# Patient Record
Sex: FEMALE | Race: WHITE | NOT HISPANIC OR LATINO
[De-identification: names, ages, dates, MRNs, and addresses within clinical notes are randomized per-mention and may not be internally consistent; named-entity substitution may affect disease eponyms.]

---

## 2022-09-28 ENCOUNTER — APPOINTMENT (OUTPATIENT)
Dept: CARDIOLOGY | Facility: CLINIC | Age: 57
End: 2022-09-28

## 2022-09-28 ENCOUNTER — NON-APPOINTMENT (OUTPATIENT)
Age: 57
End: 2022-09-28

## 2022-09-28 VITALS — DIASTOLIC BLOOD PRESSURE: 80 MMHG | SYSTOLIC BLOOD PRESSURE: 128 MMHG

## 2022-09-28 VITALS
TEMPERATURE: 98 F | WEIGHT: 192 LBS | HEIGHT: 68 IN | OXYGEN SATURATION: 95 % | SYSTOLIC BLOOD PRESSURE: 132 MMHG | DIASTOLIC BLOOD PRESSURE: 82 MMHG | HEART RATE: 100 BPM | BODY MASS INDEX: 29.1 KG/M2

## 2022-09-28 DIAGNOSIS — G47.00 INSOMNIA, UNSPECIFIED: ICD-10-CM

## 2022-09-28 DIAGNOSIS — Z00.00 ENCOUNTER FOR GENERAL ADULT MEDICAL EXAMINATION W/OUT ABNORMAL FINDINGS: ICD-10-CM

## 2022-09-28 DIAGNOSIS — Z83.3 FAMILY HISTORY OF DIABETES MELLITUS: ICD-10-CM

## 2022-09-28 DIAGNOSIS — Z78.9 OTHER SPECIFIED HEALTH STATUS: ICD-10-CM

## 2022-09-28 DIAGNOSIS — Z72.89 OTHER PROBLEMS RELATED TO LIFESTYLE: ICD-10-CM

## 2022-09-28 PROCEDURE — 99205 OFFICE O/P NEW HI 60 MIN: CPT | Mod: 25

## 2022-09-28 PROCEDURE — 93000 ELECTROCARDIOGRAM COMPLETE: CPT

## 2022-09-28 NOTE — DISCUSSION/SUMMARY
[Patient] : the patient [Risks] : risks [Benefits] : benefits [Alternatives] : alternatives [With Me] : with me [___ Month(s)] : in [unfilled] month(s) [FreeTextEntry1] : This is a 56 year old woman with history insomnia, dyslipidemia , and Diabetes Mellitus , here for coronary artery disease evaluation.\par \par 1) coronary artery disease evaluation: riskf factors for coronary artery disease . smoking, .md and dyslipidemia , Exercise stress test.  2D echo. \par 2)  dyslipidemia : ct statins.\par next visit evaluation for ct calcium scopre  [EKG obtained to assist in diagnosis and management of assessed problem(s)] : EKG obtained to assist in diagnosis and management of assessed problem(s)

## 2022-09-28 NOTE — HISTORY OF PRESENT ILLNESS
[FreeTextEntry1] : Insomnia and coronary artery disease evaluation \par \par \par This is a 56 year old woman with history insomnia, dyslipidemia , and Diabetes Mellitus , here for coronary artery disease evaluation.\par difficulty falling asleep. \par no chest pain on exertion. no dyspnea on exertion no dizziness. no syncope. no palpitaitons.\par \par \par family history \par Mother No myocardial infarction . no cerebrovascular accident \par father: no myocardial infarction . no cerebrovascular accident \par \par occasional alcohol. occasional smoking. no drugs.  prior extensive smoke. a pack a day when she was 19/s and 29.  and she moke for 20 years.\par  \par \par

## 2022-10-27 ENCOUNTER — APPOINTMENT (OUTPATIENT)
Dept: CARDIOLOGY | Facility: CLINIC | Age: 57
End: 2022-10-27

## 2022-11-07 ENCOUNTER — APPOINTMENT (OUTPATIENT)
Dept: CARDIOLOGY | Facility: CLINIC | Age: 57
End: 2022-11-07

## 2023-01-10 ENCOUNTER — APPOINTMENT (OUTPATIENT)
Dept: CARDIOLOGY | Facility: CLINIC | Age: 58
End: 2023-01-10

## 2023-01-17 ENCOUNTER — APPOINTMENT (OUTPATIENT)
Dept: CARDIOLOGY | Facility: CLINIC | Age: 58
End: 2023-01-17

## 2023-04-04 ENCOUNTER — APPOINTMENT (OUTPATIENT)
Dept: CARDIOLOGY | Facility: CLINIC | Age: 58
End: 2023-04-04

## 2023-04-04 RX ORDER — INSULIN GLARGINE 100 [IU]/ML
100 INJECTION, SOLUTION SUBCUTANEOUS DAILY
Refills: 0 | Status: ACTIVE | COMMUNITY

## 2023-04-04 RX ORDER — METFORMIN HYDROCHLORIDE 1000 MG/1
1000 TABLET, COATED ORAL DAILY
Refills: 0 | Status: ACTIVE | COMMUNITY

## 2023-04-04 RX ORDER — ATORVASTATIN CALCIUM 40 MG/1
40 TABLET, FILM COATED ORAL DAILY
Refills: 0 | Status: ACTIVE | COMMUNITY

## 2023-06-07 ENCOUNTER — NON-APPOINTMENT (OUTPATIENT)
Age: 58
End: 2023-06-07

## 2023-06-07 ENCOUNTER — APPOINTMENT (OUTPATIENT)
Dept: CARDIOLOGY | Facility: CLINIC | Age: 58
End: 2023-06-07
Payer: COMMERCIAL

## 2023-06-07 VITALS
HEART RATE: 101 BPM | SYSTOLIC BLOOD PRESSURE: 162 MMHG | TEMPERATURE: 97.9 F | OXYGEN SATURATION: 96 % | DIASTOLIC BLOOD PRESSURE: 90 MMHG | BODY MASS INDEX: 29.35 KG/M2 | WEIGHT: 193 LBS

## 2023-06-07 DIAGNOSIS — I73.9 PERIPHERAL VASCULAR DISEASE, UNSPECIFIED: ICD-10-CM

## 2023-06-07 DIAGNOSIS — E78.00 PURE HYPERCHOLESTEROLEMIA, UNSPECIFIED: ICD-10-CM

## 2023-06-07 DIAGNOSIS — R94.31 ABNORMAL ELECTROCARDIOGRAM [ECG] [EKG]: ICD-10-CM

## 2023-06-07 DIAGNOSIS — E11.9 TYPE 2 DIABETES MELLITUS W/OUT COMPLICATIONS: ICD-10-CM

## 2023-06-07 DIAGNOSIS — I10 ESSENTIAL (PRIMARY) HYPERTENSION: ICD-10-CM

## 2023-06-07 PROCEDURE — 93000 ELECTROCARDIOGRAM COMPLETE: CPT

## 2023-06-07 PROCEDURE — 99214 OFFICE O/P EST MOD 30 MIN: CPT | Mod: 25

## 2023-06-28 ENCOUNTER — APPOINTMENT (OUTPATIENT)
Dept: CARDIOLOGY | Facility: CLINIC | Age: 58
End: 2023-06-28
Payer: COMMERCIAL

## 2023-06-28 PROCEDURE — 93015 CV STRESS TEST SUPVJ I&R: CPT

## 2023-08-22 ENCOUNTER — APPOINTMENT (OUTPATIENT)
Dept: CARDIOLOGY | Facility: CLINIC | Age: 58
End: 2023-08-22
Payer: COMMERCIAL

## 2023-08-22 PROCEDURE — 78452 HT MUSCLE IMAGE SPECT MULT: CPT

## 2023-08-22 PROCEDURE — 93015 CV STRESS TEST SUPVJ I&R: CPT

## 2023-08-22 PROCEDURE — A9500: CPT

## 2023-09-26 ENCOUNTER — APPOINTMENT (OUTPATIENT)
Dept: CARDIOLOGY | Facility: CLINIC | Age: 58
End: 2023-09-26
Payer: COMMERCIAL

## 2023-09-26 PROCEDURE — 93923 UPR/LXTR ART STDY 3+ LVLS: CPT

## 2023-09-26 PROCEDURE — 93306 TTE W/DOPPLER COMPLETE: CPT

## 2023-10-02 ENCOUNTER — NON-APPOINTMENT (OUTPATIENT)
Age: 58
End: 2023-10-02

## 2023-10-03 DIAGNOSIS — R68.89 OTHER GENERAL SYMPTOMS AND SIGNS: ICD-10-CM

## 2023-11-06 ENCOUNTER — APPOINTMENT (OUTPATIENT)
Dept: CARDIOLOGY | Facility: CLINIC | Age: 58
End: 2023-11-06
Payer: COMMERCIAL

## 2023-11-06 PROCEDURE — 93925 LOWER EXTREMITY STUDY: CPT

## 2023-11-07 ENCOUNTER — OFFICE (OUTPATIENT)
Facility: LOCATION | Age: 58
Setting detail: OPHTHALMOLOGY
End: 2023-11-07
Payer: COMMERCIAL

## 2023-11-07 ENCOUNTER — NON-APPOINTMENT (OUTPATIENT)
Age: 58
End: 2023-11-07

## 2023-11-07 DIAGNOSIS — H25.11: ICD-10-CM

## 2023-11-07 DIAGNOSIS — E11.9: ICD-10-CM

## 2023-11-07 DIAGNOSIS — H35.373: ICD-10-CM

## 2023-11-07 DIAGNOSIS — H16.223: ICD-10-CM
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
## 2023-11-07 DIAGNOSIS — Z13.5: ICD-10-CM

## 2023-11-07 DIAGNOSIS — Z96.1: ICD-10-CM

## 2023-11-07 PROCEDURE — OPTOS FUNDUS PHOTOGRAPHY - NO FINDINGS: Performed by: OPHTHALMOLOGY

## 2023-11-07 PROCEDURE — 92014 COMPRE OPH EXAM EST PT 1/>: CPT | Performed by: OPHTHALMOLOGY

## 2023-11-07 PROCEDURE — 92250 FUNDUS PHOTOGRAPHY W/I&R: CPT | Mod: GY | Performed by: OPHTHALMOLOGY

## 2023-11-07 ASSESSMENT — CORNEAL DYSTROPHY - POSTERIOR
OD_POSTERIORDYSTROPHY: T GUTTATA
OS_POSTERIORDYSTROPHY: T GUTTATA

## 2023-11-07 ASSESSMENT — CONFRONTATIONAL VISUAL FIELD TEST (CVF)
OS_FINDINGS: FULL
OD_FINDINGS: FULL

## 2023-11-07 ASSESSMENT — SUPERFICIAL PUNCTATE KERATITIS (SPK)
OD_SPK: T 1+
OS_SPK: T 1+

## 2023-11-08 ASSESSMENT — REFRACTION_CURRENTRX
OD_AXIS: 106
OS_CYLINDER: +1.25
OD_CYLINDER: +0.50
OS_AXIS: 095
OD_SPHERE: +0.50
OS_SPHERE: +1.75
OD_OVR_VA: 20/
OS_OVR_VA: 20/
OD_VPRISM_DIRECTION: SV
OS_VPRISM_DIRECTION: SV

## 2023-11-08 ASSESSMENT — REFRACTION_AUTOREFRACTION
OS_CYLINDER: +1.25
OS_AXIS: 107
OS_SPHERE: -0.50
OD_SPHERE: -1.00
OD_CYLINDER: +0.50
OD_AXIS: 064

## 2023-11-08 ASSESSMENT — SPHEQUIV_DERIVED
OS_SPHEQUIV: 0.125
OD_SPHEQUIV: -0.75

## 2023-11-20 ENCOUNTER — OFFICE (OUTPATIENT)
Facility: LOCATION | Age: 58
Setting detail: OPHTHALMOLOGY
End: 2023-11-20
Payer: COMMERCIAL

## 2023-11-20 DIAGNOSIS — H25.11: ICD-10-CM

## 2023-11-20 DIAGNOSIS — Z13.5: ICD-10-CM

## 2023-11-20 DIAGNOSIS — H31.29: ICD-10-CM

## 2023-11-20 DIAGNOSIS — H18.513: ICD-10-CM

## 2023-11-20 DIAGNOSIS — H43.393: ICD-10-CM

## 2023-11-20 DIAGNOSIS — H35.373: ICD-10-CM

## 2023-11-20 DIAGNOSIS — H16.223: ICD-10-CM

## 2023-11-20 DIAGNOSIS — E11.9: ICD-10-CM

## 2023-11-20 PROCEDURE — 92250 FUNDUS PHOTOGRAPHY W/I&R: CPT | Mod: GY | Performed by: OPHTHALMOLOGY

## 2023-11-20 PROCEDURE — 99213 OFFICE O/P EST LOW 20 MIN: CPT | Performed by: OPHTHALMOLOGY

## 2023-11-20 ASSESSMENT — CONFRONTATIONAL VISUAL FIELD TEST (CVF)
OS_FINDINGS: FULL
OD_FINDINGS: FULL

## 2023-11-20 ASSESSMENT — SUPERFICIAL PUNCTATE KERATITIS (SPK)
OD_SPK: T 1+
OS_SPK: T 1+

## 2023-11-20 ASSESSMENT — CORNEAL DYSTROPHY - POSTERIOR
OS_POSTERIORDYSTROPHY: T GUTTATA
OD_POSTERIORDYSTROPHY: T GUTTATA

## 2023-11-21 ASSESSMENT — REFRACTION_CURRENTRX
OS_SPHERE: +1.75
OS_CYLINDER: +1.25
OS_AXIS: 095
OD_OVR_VA: 20/
OS_VPRISM_DIRECTION: SV
OD_CYLINDER: +0.50
OD_SPHERE: +0.50
OD_AXIS: 106
OS_OVR_VA: 20/
OD_VPRISM_DIRECTION: SV

## 2023-11-21 ASSESSMENT — REFRACTION_AUTOREFRACTION
OS_CYLINDER: +1.25
OD_AXIS: 064
OS_SPHERE: -0.50
OD_SPHERE: -1.00
OS_AXIS: 107
OD_CYLINDER: +0.50

## 2023-11-21 ASSESSMENT — SPHEQUIV_DERIVED
OS_SPHEQUIV: 0.125
OD_SPHEQUIV: -0.75

## 2023-12-19 ENCOUNTER — APPOINTMENT (OUTPATIENT)
Dept: CARDIOLOGY | Facility: CLINIC | Age: 58
End: 2023-12-19

## 2024-02-29 ENCOUNTER — APPOINTMENT (OUTPATIENT)
Dept: GYNECOLOGIC ONCOLOGY | Facility: CLINIC | Age: 59
End: 2024-02-29
Payer: COMMERCIAL

## 2024-02-29 DIAGNOSIS — N83.8 OTHER NONINFLAMMATORY DISORDERS OF OVARY, FALLOPIAN TUBE AND BROAD LIGAMENT: ICD-10-CM

## 2024-02-29 DIAGNOSIS — Z87.891 PERSONAL HISTORY OF NICOTINE DEPENDENCE: ICD-10-CM

## 2024-02-29 PROCEDURE — 99459 PELVIC EXAMINATION: CPT

## 2024-02-29 PROCEDURE — 99204 OFFICE O/P NEW MOD 45 MIN: CPT

## 2024-02-29 RX ORDER — RAMIPRIL 10 MG/1
10 CAPSULE ORAL DAILY
Refills: 0 | Status: DISCONTINUED | COMMUNITY
End: 2024-02-29

## 2024-03-04 ENCOUNTER — NON-APPOINTMENT (OUTPATIENT)
Age: 59
End: 2024-03-04

## 2024-03-04 ENCOUNTER — APPOINTMENT (OUTPATIENT)
Dept: INTERNAL MEDICINE | Facility: CLINIC | Age: 59
End: 2024-03-04

## 2024-03-08 NOTE — CHIEF COMPLAINT
[FreeTextEntry1] : [facility] [phone] at [address] [place] [address]

## 2024-03-08 NOTE — REVIEW OF SYSTEMS
[Negative] : Musculoskeletal [Hematuria] : no hematuria [Dysuria] : no dysuria [Abn Vag Bleeding] : no abnormal vaginal bleeding [Vaginal Discharge] : no vaginal discharge [Dyspareunia] : no dyspareunia [Incontinence] : no incontinence [FreeTextEntry4] : b/l pelvic discomfort

## 2024-03-08 NOTE — HISTORY OF PRESENT ILLNESS
[FreeTextEntry1] : 59yo  LMP in her 30's presents today with referral from Dr. Doran for ovarian mass. Pt. reports occasional discomfort in bilateral pelvis for a few months now. Pelvic US performed 24 revealed 4.4cm septated cystic mass in R. ovary with internal vascularity. Uterus [id number]'s & calcified.  Pelvic MRI performed on 24 revealed enlarged R. ovary, 4.5cm's with septate cystic mass measuring 2cm's with enhancing mural nodule. 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 Posterior to mass is 3cm's, unilocular cyst which may represent adjacent cyst vs. posterior extension of lesion. No lymphadenopathy or peritoneal disease. No ascites. Patient denies early satiety or bloating, PMB or changes in bowel/bladder habits.    was WNL, CEA was elevated at 8.6. Both performed 24  Pt quit smoking 3 weeks ago.  LPAP- 2024: negative with negative HPV LMammo- 2024: BIRADS 2 LColonoscopy- Scheduled for cosultation  with Dr. Rick in [place] LBone Density Scan- never

## 2024-03-08 NOTE — END OF VISIT
[FreeTextEntry3] :  I, Dr. Rosina Jim personally performed the evaluation and management (E/M) services for this new patient. That E/M includes conducting the initial examination, assessing all conditions, and establishing the plan of care.  Today, Miesha Armenta PA-C, was here to observe my evaluation and management services for this patient to be followed going forward.

## 2024-03-08 NOTE — PLAN
[TextEntry] : RA lap BSO FS possible hysterectomy, possible staging Consent signed Acknowledgement of hysterectomy form signed CLD prep reviewed with patient Pre-op Colonoscopy Medical Clearance pre-op PST with at least CBC, BMP and EKG

## 2024-03-08 NOTE — ASSESSMENT
[FreeTextEntry1] : 59yo with ovarian mass. Elevated CEA  I discussed with pt. that above mass could represent a benign etiology, precancer or cancer. The only way to know with 100% certainty is with removal. Her CA-125 is reassuring, however MRI findings still warrant removal.   I discussed at length with the patient the nature, purpose, risks, benefits, and alternatives of robot-assisted laparoscopic bilateral salpingo-oophorectomy, frozen section, possible hysterectomy, possible staging.  The patient understands the risks to include (but not be limited to) bowel injury, bleeding (with the possible need for transfusion), bladder or ureteral injury, infections, deep venous thrombosis, and jens-operative death.  The patient also understands that her surgery may not be able to be performed laparoscopically and that she may need a laparotomy.  She also understands the limitations of laparoscopic surgery and the possibility of missing a surgical complication with need for subsequent re-exploration.  She agrees to proceed.  She asked numerous questions which were answered to her satisfaction. She also understands the rationale for surgical staging should a malignancy be encountered and understands that that may require a larger surgery and even, possibly, a laparotomy.

## 2024-03-08 NOTE — PHYSICAL EXAM
[Chaperone Present] : A chaperone was present in the examining room during all aspects of the physical examination [Normal] : Recto-Vaginal Exam: Normal [FreeTextEntry1] : Alix Armenta PA-C [de-identified] : tender on medial left groin, no enlarged LN's [de-identified] : ovaries mobile, no RV nodularity or compression.

## 2024-06-05 DIAGNOSIS — Z01.818 ENCOUNTER FOR OTHER PREPROCEDURAL EXAMINATION: ICD-10-CM

## 2024-06-11 ENCOUNTER — OUTPATIENT (OUTPATIENT)
Dept: OUTPATIENT SERVICES | Facility: HOSPITAL | Age: 59
LOS: 1 days | End: 2024-06-11
Payer: COMMERCIAL

## 2024-06-11 VITALS
OXYGEN SATURATION: 97 % | HEIGHT: 65 IN | WEIGHT: 190.92 LBS | TEMPERATURE: 97 F | SYSTOLIC BLOOD PRESSURE: 140 MMHG | RESPIRATION RATE: 20 BRPM | DIASTOLIC BLOOD PRESSURE: 80 MMHG | HEART RATE: 91 BPM

## 2024-06-11 DIAGNOSIS — Z13.89 ENCOUNTER FOR SCREENING FOR OTHER DISORDER: ICD-10-CM

## 2024-06-11 DIAGNOSIS — N83.8 OTHER NONINFLAMMATORY DISORDERS OF OVARY, FALLOPIAN TUBE AND BROAD LIGAMENT: ICD-10-CM

## 2024-06-11 DIAGNOSIS — Z29.9 ENCOUNTER FOR PROPHYLACTIC MEASURES, UNSPECIFIED: ICD-10-CM

## 2024-06-11 DIAGNOSIS — I10 ESSENTIAL (PRIMARY) HYPERTENSION: ICD-10-CM

## 2024-06-11 DIAGNOSIS — E11.9 TYPE 2 DIABETES MELLITUS WITHOUT COMPLICATIONS: ICD-10-CM

## 2024-06-11 DIAGNOSIS — Z01.818 ENCOUNTER FOR OTHER PREPROCEDURAL EXAMINATION: ICD-10-CM

## 2024-06-11 PROCEDURE — 93005 ELECTROCARDIOGRAM TRACING: CPT

## 2024-06-11 PROCEDURE — 93010 ELECTROCARDIOGRAM REPORT: CPT

## 2024-06-11 PROCEDURE — G0463: CPT

## 2024-06-11 NOTE — H&P PST ADULT - MUSCULOSKELETAL
negative no joint swelling/no joint erythema/no joint warmth/strength 5/5 bilateral upper extremities/strength 5/5 bilateral lower extremities

## 2024-06-11 NOTE — H&P PST ADULT - NSICDXFAMILYHX_GEN_ALL_CORE_FT
FAMILY HISTORY:  Father  Still living? Unknown  FH: diabetes mellitus, Age at diagnosis: Age Unknown    Mother  Still living? Unknown  FH: diabetes mellitus, Age at diagnosis: Age Unknown    Sibling  Still living? Unknown  FH: pancreatic cancer, Age at diagnosis: Age Unknown

## 2024-06-11 NOTE — H&P PST ADULT - PROBLEM SELECTOR PLAN 1
57yo  LMP in her 30's with PMH DM II, HLD, HTN now with ovarian mass scheduled for pelvic exam under anesthesia, robotic assisted laparoscopic, bilateral salpingo-oophorectomy, frozen section, possible hysterectomy/staging on 2024.     -Medical evaluation pending  - Patient educated on ERP protocol (written/verbal)- verbalized understanding  -Educated on NSAIDS, multivitamins and herbals that increase the risk of bleeding and need to be stopped 5 days before procedure  -Educated on infection prevention  -Tylenol can be taken if needed for pain  -Lantus per PCP  -Stop metformin evening before  Stop ozempic 1 week before surgery   -Will continue all other medications as prescribed  -Verbalized understanding of all instructions.

## 2024-06-11 NOTE — H&P PST ADULT - ASSESSMENT
59yo  LMP in her 30's with PMH DM II, HLD, HTN now with ovarian mass scheduled for pelvic exam under anesthesia, robotic assisted laparoscopic, bilateral salpingo-oophorectomy, frozen section, possible hysterectomy/staging on 2024.     -Medical evaluation pending  - Patient educated on ERP protocol (written/verbal)- verbalized understanding  -Educated on NSAIDS, multivitamins and herbals that increase the risk of bleeding and need to be stopped 5 days before procedure  -Educated on infection prevention  -Tylenol can be taken if needed for pain  -Lantus per PCP  -Stop metformin evening before  Stop ozempic 1 week before surgery   -Will continue all other medications as prescribed  -Verbalized understanding of all instructions.      OPIOID RISK TOOL    NOLAN EACH BOX THAT APPLIES AND ADD TOTALS AT THE END    FAMILY HISTORY OF SUBSTANCE ABUSE                 FEMALE         MALE                                                Alcohol                             [  ]1 pt          [  ]3pts                                               Illegal Durgs                     [  ]2 pts        [  ]3pts                                               Rx Drugs                           [  ]4 pts        [  ]4 pts    PERSONAL HISTORY OF SUBSTANCE ABUSE                                                                                          Alcohol                             [  ]3 pts       [  ]3 pts                                               Illegal Drugs                     [  ]4 pts        [  ]4 pts                                               Rx Drugs                           [  ]5 pts        [  ]5 pts    AGE BETWEEN 16-45 YEARS                                      [  ]1 pt         [  ]1 pt    HISTORY OF PREADOLESCENT   SEXUAL ABUSE                                                             [  ]3 pts        [  ]0pts    PSYCHOLOGICAL DISEASE                     ADD, OCD, Bipolar, Schizophrenia        [  ]2 pts         [  ]2 pts                      Depression                                               [  ]1 pt           [  ]1 pt           SCORING TOTAL   (add numbers and type here)              (0)                                     A score of 3 or lower indicated LOW risk for future opioid abuse  A score of 4 to 7 indicated moderate risk for future opioid abuse  A score of 8 or higher indicates a high risk for opioid abuse        CAPRINI SCORE    AGE RELATED RISK FACTORS                                                             [ x] Age 41-60 years                                            (1 Point)  [ ] Age: 61-74 years                                           (2 Points)                 [ ] Age= 75 years                                                (3 Points)             DISEASE RELATED RISK FACTORS                                                       [ ] Edema in the lower extremities                 (1 Point)                     [ ] Varicose veins                                               (1 Point)                                 [x ] BMI > 25 Kg/m2                                            (1 Point)                                  [ ] Serious infection (ie PNA)                            (1 Point)                     [ ] Lung disease ( COPD, Emphysema)            (1 Point)                                                                          [ ] Acute myocardial infarction                         (1 Point)                  [ ] Congestive heart failure (in the previous month)  (1 Point)         [ ] Inflammatory bowel disease                            (1 Point)                  [ ] Central venous access, PICC or Port               (2 points)       (within the last month)                                                                [ ] Stroke (in the previous month)                        (5 Points)    [ ] Previous or present malignancy                       (2 points)                                                                                                                                                         HEMATOLOGY RELATED FACTORS                                                         [ ] Prior episodes of VTE                                     (3 Points)                     [ ] Positive family history for VTE                      (3 Points)                  [ ] Prothrombin 80469 A                                     (3 Points)                     [ ] Factor V Leiden                                                (3 Points)                        [ ] Lupus anticoagulants                                      (3 Points)                                                           [ ] Anticardiolipin antibodies                              (3 Points)                                                       [ ] High homocysteine in the blood                   (3 Points)                                             [ ] Other congenital or acquired thrombophilia      (3 Points)                                                [ ] Heparin induced thrombocytopenia                  (3 Points)                                        MOBILITY RELATED FACTORS  [ ] Bed rest                                                         (1 Point)  [ ] Plaster cast                                                    (2 points)  [ ] Bed bound for more than 72 hours           (2 Points)    GENDER SPECIFIC FACTORS  [ ] Pregnancy or had a baby within the last month   (1 Point)  [ ] Post-partum < 6 weeks                                   (1 Point)  [ ] Hormonal therapy  or oral contraception   (1 Point)  [ ] History of pregnancy complications              (1 point)  [ ] Unexplained or recurrent              (1 Point)    OTHER RISK FACTORS                                           (1 Point)  [ ] BMI >40, smoking, diabetes requiring insulin, chemotherapy  blood transfusions and length of surgery over 2 hours    SURGERY RELATED RISK FACTORS  [ ]  Section within the last month     (1 Point)  [ ] Minor surgery                                                  (1 Point)  [ ] Arthroscopic surgery                                       (2 Points)  [x ] Planned major surgery lasting more            (2 Points)      than 45 minutes     [ ] Elective hip or knee joint replacement       (5 points)       surgery                                                TRAUMA RELATED RISK FACTORS  [ ] Fracture of the hip, pelvis, or leg                       (5 Points)  [ ] Spinal cord injury resulting in paralysis             (5 points)       (in the previous month)    [ ] Paralysis  (less than 1 month)                             (5 Points)  [ ] Multiple Trauma within 1 month                        (5 Points)    Total Score [       4 ]    Caprini Score 0-2: Low Risk, NO VTE prophylaxis required for most patients, encourage ambulation  Caprini Score 3-6: Moderate Risk , pharmacologic VTE prophylaxis is indicated for most patients (in the absence of contraindications)  Caprini Score Greater than or =7: High risk, pharmocologic VTE prophylaxis indicated for most patients (in the absence of contraindications)

## 2024-06-11 NOTE — H&P PST ADULT - HISTORY OF PRESENT ILLNESS
?     ?57yo  LMP in her 30's presents today with referral from Dr. Doran for ovarian mass. Pt. reports occasional discomfort in bilateral pelvis for a few months now. Pelvic US performed 24 revealed 4.4cm septated cystic mass in R. ovary with internal vascularity. Uterus [id number]'s & calcified. Pelvic MRI performed on 24 revealed enlarged R. ovary, 4.5cm's with septate cystic mass measuring 2cm's with enhancing mural nodule. 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 Posterior to mass is 3cm's, unilocular cyst which may represent adjacent cyst vs. posterior extension of lesion. No lymphadenopathy or peritoneal disease. No ascites. Patient denies early satiety or bloating, PMB or changes in bowel/bladder habits.  ?   was WNL, CEA was elevated at 8.6. Both performed 24  ?  Pt quit smoking 3 weeks ago.  ?  LPAP- 2024: negative with negative HPV  LMammo- 2024: BIRADS 2  LColonoscopy- Scheduled for cosultation  with Dr. Rick in [place]  LBone Density Scan- never  ?  Current Meds  Lantus 100 UNIT/ML Subcutaneous Solution; INJECT 60 UNIT Daily  metFORMIN HCl - 1000 MG Oral Tablet; TAKE 1 TABLET DAILY WITH FOOD  Atorvastatin Calcium 40 MG Oral Tablet; TAKE 1 TABLET DAILY   ?57yo  LMP in her 30's with PMH DM II, HLD, HTN presents  to [facility] today. Reports pain to right lateral sided pain and was told by GI to obtain in ultrasound. Pelvic US performed 24 revealed 4.4cm septated cystic mass in R. ovary with internal vascularity. Uterus [id number] & calcified. Pelvic MRI performed on 24 revealed enlarged R. ovary, 4.5cm's with septate cystic mass measuring 2cm's with enhancing mural nodule. Denies any abnormal vaginal bleeding, denies any current pain at this time. Denies any bowel and bladder.        presents today with referral from Dr. Doran for ovarian mass. Pt. reports occasional discomfort in bilateral pelvis for a few months now. Pelvic US performed 24 revealed 4.4cm septated cystic mass in R. ovary with internal vascularity. Uterus [id number]'s & calcified. Pelvic MRI performed on 24 revealed enlarged R. ovary, 4.5cm's with septate cystic mass measuring 2cm's with enhancing mural nodule. 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 Posterior to mass is 3cm's, unilocular cyst which may represent adjacent cyst vs. posterior extension of lesion. No lymphadenopathy or peritoneal disease. No ascites. Patient denies early satiety or bloating, PMB or changes in bowel/bladder habits.  ?   was WNL, CEA was elevated at 8.6. Both performed 24  ?  Pt quit smoking 3 weeks ago.  ?  LPAP- 2024: negative with negative HPV  LMammo- 2024: BIRADS 2  LColonoscopy- Scheduled for cosultation  with Dr. Rick in [place]  LBone Density Scan- never  ?  Current Meds  Lantus 100 UNIT/ML Subcutaneous Solution; INJECT 60 UNIT Daily  metFORMIN HCl - 1000 MG Oral Tablet; TAKE 1 TABLET DAILY WITH FOOD  Atorvastatin Calcium 40 MG Oral Tablet; TAKE 1 TABLET DAILY   59yo  LMP in her 30's with PMH DM II, HLD, HTN presents  to [facility] today. Reports pain to right lateral sided pain and was told by GI to obtain in ultrasound. Pelvic US performed 24 revealed 4.4cm septated cystic mass in R. ovary with internal vascularity. Uterus [id number]'s & calcified. Pelvic MRI performed on 24 revealed enlarged R. ovary, 4.5cm's with septate cystic mass measuring 2cm's with enhancing mural nodule. Denies any abnormal vaginal bleeding, denies any current pain at this time. Denies any bowel and bladder problems. Scheduled for pelvic exam under anesthesia, robotic assisted laparoscopic, bilateral salpingo-oophorectomy, frozen section, possible hysterectomy/staging on 2024.

## 2024-06-24 ENCOUNTER — RESULT REVIEW (OUTPATIENT)
Age: 59
End: 2024-06-24

## 2024-06-24 ENCOUNTER — OUTPATIENT (OUTPATIENT)
Dept: INPATIENT UNIT | Facility: HOSPITAL | Age: 59
LOS: 1 days | End: 2024-06-24
Payer: COMMERCIAL

## 2024-06-24 VITALS
TEMPERATURE: 97 F | WEIGHT: 168.87 LBS | OXYGEN SATURATION: 98 % | RESPIRATION RATE: 16 BRPM | HEART RATE: 76 BPM | DIASTOLIC BLOOD PRESSURE: 79 MMHG | SYSTOLIC BLOOD PRESSURE: 121 MMHG | HEIGHT: 65 IN

## 2024-06-24 VITALS
HEART RATE: 72 BPM | RESPIRATION RATE: 20 BRPM | SYSTOLIC BLOOD PRESSURE: 144 MMHG | OXYGEN SATURATION: 97 % | DIASTOLIC BLOOD PRESSURE: 88 MMHG

## 2024-06-24 DIAGNOSIS — N83.8 OTHER NONINFLAMMATORY DISORDERS OF OVARY, FALLOPIAN TUBE AND BROAD LIGAMENT: ICD-10-CM

## 2024-06-24 LAB
BLD GP AB SCN SERPL QL: SIGNIFICANT CHANGE UP
GLUCOSE BLDC GLUCOMTR-MCNC: 103 MG/DL — HIGH (ref 70–99)
GLUCOSE BLDC GLUCOMTR-MCNC: 68 MG/DL — LOW (ref 70–99)
GLUCOSE BLDC GLUCOMTR-MCNC: 86 MG/DL — SIGNIFICANT CHANGE UP (ref 70–99)
HCT VFR BLD CALC: 43.2 % — SIGNIFICANT CHANGE UP (ref 34.5–45)
HGB BLD-MCNC: 14.3 G/DL — SIGNIFICANT CHANGE UP (ref 11.5–15.5)
MCHC RBC-ENTMCNC: 30.2 PG — SIGNIFICANT CHANGE UP (ref 27–34)
MCHC RBC-ENTMCNC: 33.1 GM/DL — SIGNIFICANT CHANGE UP (ref 32–36)
MCV RBC AUTO: 91.1 FL — SIGNIFICANT CHANGE UP (ref 80–100)
PLATELET # BLD AUTO: 404 K/UL — HIGH (ref 150–400)
RBC # BLD: 4.74 M/UL — SIGNIFICANT CHANGE UP (ref 3.8–5.2)
RBC # FLD: 13.1 % — SIGNIFICANT CHANGE UP (ref 10.3–14.5)
WBC # BLD: 13.05 K/UL — HIGH (ref 3.8–10.5)
WBC # FLD AUTO: 13.05 K/UL — HIGH (ref 3.8–10.5)

## 2024-06-24 PROCEDURE — 86850 RBC ANTIBODY SCREEN: CPT

## 2024-06-24 PROCEDURE — 88112 CYTOPATH CELL ENHANCE TECH: CPT

## 2024-06-24 PROCEDURE — 58661 LAPAROSCOPY REMOVE ADNEXA: CPT | Mod: 50

## 2024-06-24 PROCEDURE — 88331 PATH CONSLTJ SURG 1 BLK 1SPC: CPT | Mod: 26

## 2024-06-24 PROCEDURE — 36415 COLL VENOUS BLD VENIPUNCTURE: CPT

## 2024-06-24 PROCEDURE — S2900: CPT

## 2024-06-24 PROCEDURE — 86900 BLOOD TYPING SEROLOGIC ABO: CPT

## 2024-06-24 PROCEDURE — 88112 CYTOPATH CELL ENHANCE TECH: CPT | Mod: 26

## 2024-06-24 PROCEDURE — C9399: CPT

## 2024-06-24 PROCEDURE — 86901 BLOOD TYPING SEROLOGIC RH(D): CPT

## 2024-06-24 PROCEDURE — C1889: CPT

## 2024-06-24 PROCEDURE — 85027 COMPLETE CBC AUTOMATED: CPT

## 2024-06-24 PROCEDURE — 88305 TISSUE EXAM BY PATHOLOGIST: CPT

## 2024-06-24 PROCEDURE — 88307 TISSUE EXAM BY PATHOLOGIST: CPT | Mod: 26

## 2024-06-24 PROCEDURE — 58661 LAPAROSCOPY REMOVE ADNEXA: CPT | Mod: 80,50

## 2024-06-24 PROCEDURE — 88331 PATH CONSLTJ SURG 1 BLK 1SPC: CPT

## 2024-06-24 PROCEDURE — 82962 GLUCOSE BLOOD TEST: CPT

## 2024-06-24 PROCEDURE — 88307 TISSUE EXAM BY PATHOLOGIST: CPT

## 2024-06-24 PROCEDURE — 88305 TISSUE EXAM BY PATHOLOGIST: CPT | Mod: 26

## 2024-06-24 DEVICE — SURGICEL 2 X 14": Type: IMPLANTABLE DEVICE | Status: FUNCTIONAL

## 2024-06-24 RX ORDER — SEMAGLUTIDE 0.68 MG/ML
0.5 INJECTION, SOLUTION SUBCUTANEOUS
Refills: 0 | DISCHARGE

## 2024-06-24 RX ORDER — CELECOXIB 100 MG/1
400 CAPSULE ORAL ONCE
Refills: 0 | Status: COMPLETED | OUTPATIENT
Start: 2024-06-24 | End: 2024-06-24

## 2024-06-24 RX ORDER — HYDROMORPHONE HCL 0.2 MG/ML
0.5 INJECTION, SOLUTION INTRAVENOUS
Refills: 0 | Status: DISCONTINUED | OUTPATIENT
Start: 2024-06-24 | End: 2024-06-24

## 2024-06-24 RX ORDER — FENTANYL CITRATE 50 UG/ML
25 INJECTION, SOLUTION INTRAMUSCULAR; INTRAVENOUS
Refills: 0 | Status: DISCONTINUED | OUTPATIENT
Start: 2024-06-24 | End: 2024-06-24

## 2024-06-24 RX ORDER — DEXTROSE MONOHYDRATE AND SODIUM CHLORIDE 5; .3 G/100ML; G/100ML
1000 INJECTION, SOLUTION INTRAVENOUS
Refills: 0 | Status: DISCONTINUED | OUTPATIENT
Start: 2024-06-24 | End: 2024-06-24

## 2024-06-24 RX ORDER — ONDANSETRON HYDROCHLORIDE 2 MG/ML
4 INJECTION INTRAMUSCULAR; INTRAVENOUS ONCE
Refills: 0 | Status: DISCONTINUED | OUTPATIENT
Start: 2024-06-24 | End: 2024-06-24

## 2024-06-24 RX ORDER — METFORMIN HYDROCHLORIDE 850 MG/1
1 TABLET ORAL
Refills: 0 | DISCHARGE

## 2024-06-24 RX ORDER — ACETAMINOPHEN 325 MG
3 TABLET ORAL
Qty: 180 | Refills: 0
Start: 2024-06-24 | End: 2024-07-08

## 2024-06-24 RX ORDER — CEFAZOLIN 10 G/1
2000 INJECTION, POWDER, FOR SOLUTION INTRAVENOUS ONCE
Refills: 0 | Status: DISCONTINUED | OUTPATIENT
Start: 2024-06-24 | End: 2024-06-24

## 2024-06-24 RX ORDER — PROPRANOLOL HCL 160 MG
1 CAPSULE, EXTENDED RELEASE 24HR ORAL
Refills: 0 | DISCHARGE

## 2024-06-24 RX ORDER — ACETAMINOPHEN 325 MG
975 TABLET ORAL ONCE
Refills: 0 | Status: COMPLETED | OUTPATIENT
Start: 2024-06-24 | End: 2024-06-24

## 2024-06-24 RX ORDER — NAPROXEN SODIUM 550 MG
1 TABLET ORAL
Qty: 28 | Refills: 0
Start: 2024-06-24 | End: 2024-07-07

## 2024-06-24 RX ORDER — SODIUM CHLORIDE 0.9 % (FLUSH) 0.9 %
3 SYRINGE (ML) INJECTION ONCE
Refills: 0 | Status: DISCONTINUED | OUTPATIENT
Start: 2024-06-24 | End: 2024-06-24

## 2024-06-24 RX ORDER — ATORVASTATIN CALCIUM 80 MG/1
1 TABLET, FILM COATED ORAL
Refills: 0 | DISCHARGE

## 2024-06-24 RX ORDER — METRONIDAZOLE 500 MG/1
500 TABLET ORAL ONCE
Refills: 0 | Status: COMPLETED | OUTPATIENT
Start: 2024-06-24 | End: 2024-06-24

## 2024-06-24 RX ORDER — INSULIN GLARGINE 100 [IU]/ML
0 INJECTION, SOLUTION SUBCUTANEOUS
Refills: 0 | DISCHARGE

## 2024-06-24 RX ADMIN — FENTANYL CITRATE 25 MICROGRAM(S): 50 INJECTION, SOLUTION INTRAMUSCULAR; INTRAVENOUS at 10:58

## 2024-06-24 RX ADMIN — METRONIDAZOLE 200 MILLIGRAM(S): 500 TABLET ORAL at 07:40

## 2024-06-24 RX ADMIN — DEXTROSE MONOHYDRATE AND SODIUM CHLORIDE 75 MILLILITER(S): 5; .3 INJECTION, SOLUTION INTRAVENOUS at 11:44

## 2024-06-24 RX ADMIN — FENTANYL CITRATE 25 MICROGRAM(S): 50 INJECTION, SOLUTION INTRAMUSCULAR; INTRAVENOUS at 11:03

## 2024-06-24 RX ADMIN — CELECOXIB 400 MILLIGRAM(S): 100 CAPSULE ORAL at 06:33

## 2024-06-24 RX ADMIN — Medication 975 MILLIGRAM(S): at 06:33

## 2024-06-25 ENCOUNTER — LABORATORY RESULT (OUTPATIENT)
Age: 59
End: 2024-06-25

## 2024-06-25 DIAGNOSIS — R39.89 OTHER SYMPTOMS AND SIGNS INVOLVING THE GENITOURINARY SYSTEM: ICD-10-CM

## 2024-06-25 PROBLEM — I10 ESSENTIAL (PRIMARY) HYPERTENSION: Chronic | Status: ACTIVE | Noted: 2024-06-11

## 2024-06-26 LAB
APPEARANCE: CLEAR
BILIRUBIN URINE: NEGATIVE
BLOOD URINE: NEGATIVE
COLOR: YELLOW
GLUCOSE QUALITATIVE U: NEGATIVE MG/DL
KETONES URINE: NEGATIVE MG/DL
LEUKOCYTE ESTERASE URINE: ABNORMAL
NITRITE URINE: NEGATIVE
NON-GYNECOLOGICAL CYTOLOGY STUDY: SIGNIFICANT CHANGE UP
PH URINE: 7
PROTEIN URINE: NEGATIVE MG/DL
SPECIFIC GRAVITY URINE: 1.01
UROBILINOGEN URINE: 0.2 MG/DL

## 2024-06-27 LAB — BACTERIA UR CULT: NORMAL

## 2024-06-28 LAB — SURGICAL PATHOLOGY STUDY: SIGNIFICANT CHANGE UP

## 2024-07-02 ENCOUNTER — NON-APPOINTMENT (OUTPATIENT)
Age: 59
End: 2024-07-02

## 2024-07-11 ENCOUNTER — APPOINTMENT (OUTPATIENT)
Dept: GYNECOLOGIC ONCOLOGY | Facility: CLINIC | Age: 59
End: 2024-07-11

## 2024-07-11 VITALS
WEIGHT: 193 LBS | DIASTOLIC BLOOD PRESSURE: 78 MMHG | HEART RATE: 116 BPM | BODY MASS INDEX: 29.25 KG/M2 | SYSTOLIC BLOOD PRESSURE: 127 MMHG | HEIGHT: 68 IN | RESPIRATION RATE: 16 BRPM | OXYGEN SATURATION: 99 %

## 2024-07-11 LAB
BILIRUB UR QL STRIP: NORMAL
COLLECTION METHOD: NORMAL
GLUCOSE UR-MCNC: NORMAL
HCG UR QL: 0.2 EU/DL
HGB UR QL STRIP.AUTO: NORMAL
KETONES UR-MCNC: NORMAL
LEUKOCYTE ESTERASE UR QL STRIP: NORMAL
NITRITE UR QL STRIP: NORMAL
PH UR STRIP: 6
PROT UR STRIP-MCNC: NORMAL
SP GR UR STRIP: 1.01

## 2024-07-11 PROCEDURE — 81003 URINALYSIS AUTO W/O SCOPE: CPT | Mod: QW

## 2024-07-11 PROCEDURE — 99214 OFFICE O/P EST MOD 30 MIN: CPT

## 2024-07-15 LAB — BACTERIA UR CULT: NORMAL

## 2024-09-16 ENCOUNTER — OFFICE (OUTPATIENT)
Facility: LOCATION | Age: 59
Setting detail: OPHTHALMOLOGY
End: 2024-09-16
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
## 2024-09-16 PROCEDURE — NO SHOW FE NO SHOW FEE: Performed by: OPHTHALMOLOGY

## (undated) DEVICE — XI ARM NEEDLE DRIVER SUTURECUT MEGA 8MM

## (undated) DEVICE — POSITIONER FOAM EGG CRATE ULNAR 2PCS (PINK)

## (undated) DEVICE — XI CORD MONOPOLAR CAUTERY (GREEN)

## (undated) DEVICE — DRAPE ROBOTIC

## (undated) DEVICE — PREP DYNA-HEX CHG 4% 4OZ BOTTLE (BACTOSHIELD)

## (undated) DEVICE — BLADE SURGICAL #11 CARBON

## (undated) DEVICE — WARMING BLANKET UPPER ADULT

## (undated) DEVICE — TROCAR SURGIQUEST AIRSEAL 8MMX100MM

## (undated) DEVICE — DRSG OPSITE 2.5 X 2"

## (undated) DEVICE — XI TIP COVER

## (undated) DEVICE — POSITIONER PINK PAD PIGAZZI SYSTEM

## (undated) DEVICE — XI OBTURATOR OPTICAL BLADELESS 8MM

## (undated) DEVICE — SOL IRR POUR H2O 1000ML

## (undated) DEVICE — ELCTR GROUNDING PAD ADULT COVIDIEN

## (undated) DEVICE — XI CORD BIPOLAR CAUTERY (BLUE)

## (undated) DEVICE — VENODYNE/SCD SLEEVE CALF MEDIUM

## (undated) DEVICE — ENDOCATCH 10MM SPECIMEN POUCH

## (undated) DEVICE — XI DRAPE ARM

## (undated) DEVICE — TUBING AIRSEAL TRI-LUMEN FILTERED

## (undated) DEVICE — XI SEAL UNIVERSIAL 5-12MM

## (undated) DEVICE — PREP TRAY DRY SKIN PREP SCRUB

## (undated) DEVICE — SUT MONOCRYL 4-0 27" PS-2 UNDYED

## (undated) DEVICE — FOLEY TRAY 16FR 5CC LF LUBRISIL ADVANCE TEMP CLOSED

## (undated) DEVICE — SOL IRR POUR NS 0.9% 1000ML

## (undated) DEVICE — PACK ROBOTIC

## (undated) DEVICE — D HELP - CLEARVIEW CLEARIFY SYSTEM

## (undated) DEVICE — DRSG KERLIX ROLL 4.5"

## (undated) DEVICE — SUT VICRYL 0 27" UR-6

## (undated) DEVICE — XI DRAPE COLUMN

## (undated) DEVICE — PREP CHLORAPREP HI-LITE ORANGE 26ML

## (undated) DEVICE — SOL INJ NS 0.9% 100ML

## (undated) DEVICE — SUT VLOC 180 0 9" GS-21 GREEN

## (undated) DEVICE — SSH-ERBE RM1 11351341: Type: DURABLE MEDICAL EQUIPMENT

## (undated) DEVICE — GLV 7.5 PROTEXIS (WHITE)

## (undated) DEVICE — XI VESSEL SEALER

## (undated) DEVICE — SUT VICRYL 2-0 27" CT-2 UNDYED

## (undated) DEVICE — DRAPE TOWEL BLUE STICKY